# Patient Record
Sex: FEMALE | Race: WHITE | NOT HISPANIC OR LATINO | Employment: FULL TIME | URBAN - NONMETROPOLITAN AREA
[De-identification: names, ages, dates, MRNs, and addresses within clinical notes are randomized per-mention and may not be internally consistent; named-entity substitution may affect disease eponyms.]

---

## 2023-07-16 ENCOUNTER — OFFICE VISIT (OUTPATIENT)
Dept: URGENT CARE | Facility: CLINIC | Age: 53
End: 2023-07-16
Payer: COMMERCIAL

## 2023-07-16 VITALS
DIASTOLIC BLOOD PRESSURE: 78 MMHG | HEIGHT: 63 IN | BODY MASS INDEX: 33.31 KG/M2 | TEMPERATURE: 98.2 F | HEART RATE: 75 BPM | WEIGHT: 188 LBS | SYSTOLIC BLOOD PRESSURE: 132 MMHG | OXYGEN SATURATION: 98 % | RESPIRATION RATE: 16 BRPM

## 2023-07-16 DIAGNOSIS — N30.01 ACUTE CYSTITIS WITH HEMATURIA: Primary | ICD-10-CM

## 2023-07-16 LAB
SL AMB  POCT GLUCOSE, UA: NEGATIVE
SL AMB LEUKOCYTE ESTERASE,UA: ABNORMAL
SL AMB POCT BILIRUBIN,UA: NEGATIVE
SL AMB POCT BLOOD,UA: ABNORMAL
SL AMB POCT CLARITY,UA: ABNORMAL
SL AMB POCT COLOR,UA: YELLOW
SL AMB POCT KETONES,UA: NEGATIVE
SL AMB POCT NITRITE,UA: POSITIVE
SL AMB POCT PH,UA: 6
SL AMB POCT SPECIFIC GRAVITY,UA: 1.02
SL AMB POCT URINE PROTEIN: 2000
SL AMB POCT UROBILINOGEN: 0.2

## 2023-07-16 PROCEDURE — 81002 URINALYSIS NONAUTO W/O SCOPE: CPT

## 2023-07-16 PROCEDURE — 87077 CULTURE AEROBIC IDENTIFY: CPT

## 2023-07-16 PROCEDURE — 87086 URINE CULTURE/COLONY COUNT: CPT

## 2023-07-16 PROCEDURE — 87186 SC STD MICRODIL/AGAR DIL: CPT

## 2023-07-16 PROCEDURE — 99203 OFFICE O/P NEW LOW 30 MIN: CPT

## 2023-07-16 RX ORDER — NITROFURANTOIN 25; 75 MG/1; MG/1
100 CAPSULE ORAL 2 TIMES DAILY
Qty: 10 CAPSULE | Refills: 0 | Status: SHIPPED | OUTPATIENT
Start: 2023-07-16 | End: 2023-07-21

## 2023-07-16 RX ORDER — HYDROCHLOROTHIAZIDE 25 MG/1
25 TABLET ORAL DAILY
COMMUNITY
End: 2023-07-26 | Stop reason: ALTCHOICE

## 2023-07-16 RX ORDER — AMOXICILLIN 500 MG/1
500 CAPSULE ORAL
COMMUNITY
Start: 2023-06-12 | End: 2023-07-26 | Stop reason: ALTCHOICE

## 2023-07-16 RX ORDER — LISINOPRIL 30 MG/1
30 TABLET ORAL DAILY
COMMUNITY
Start: 2023-04-23

## 2023-07-16 RX ORDER — ATENOLOL 50 MG/1
75 TABLET ORAL DAILY
COMMUNITY
Start: 2023-05-17

## 2023-07-16 RX ORDER — CHLORTHALIDONE 25 MG/1
25 TABLET ORAL DAILY
COMMUNITY
Start: 2023-06-10

## 2023-07-16 NOTE — PATIENT INSTRUCTIONS
Take antibiotic as directed. Take entire course of antibiotics. Eat yogurt with live and active cultures and/or take a probiotic at least 3 hours before or after antibiotic dose. Monitor stool for diarrhea and/or blood. If this occurs, contact primary care doctor ASAP. Recommend drinking plenty of fluids including water and cranberry juice. Recommend avoiding caffeine or alcohol. Reviewed proper toileting hygiene, wipe front to back. Empty bladder frequently. Reviewed that antibiotic will make birth control less effective in preventing pregnancy. Continue using condoms, ideally always, but at least until she starts next pill pack. Avoid sexual intercourse until antibiotics are complete. If you develop any high fever, severe back pain, abdominal pain, nausea, vomiting or any concerning symptoms please return proceed ER.

## 2023-07-16 NOTE — PROGRESS NOTES
[facility]        NAME: Malika Santana is a 46 y.o. female  : 1970    MRN: [id number]  DATE: 2023  TIME: 12:24 PM    Assessment and Plan   Acute cystitis with hematuria [N30.01]  1. Acute cystitis with hematuria  POCT urine dip    nitrofurantoin (MACROBID) 100 mg capsule    Urine culture        POCT urine dip: Will send urine culture. Patient Instructions     Take antibiotic as directed. Take entire course of antibiotics. Eat yogurt with live and active cultures and/or take a probiotic at least 3 hours before or after antibiotic dose. Monitor stool for diarrhea and/or blood. If this occurs, contact primary care doctor ASAP. Recommend drinking plenty of fluids including water and cranberry juice. Recommend avoiding caffeine or alcohol. Reviewed proper toileting hygiene, wipe front to back. Empty bladder frequently. Reviewed that antibiotic will make birth control less effective in preventing pregnancy. Continue using condoms, ideally always, but at least until she starts next pill pack. Avoid sexual intercourse until antibiotics are complete. If you develop any high fever, severe back pain, abdominal pain, nausea, vomiting or any concerning symptoms please return proceed ER. Chief Complaint     Chief Complaint   Patient presents with   • Urinary Tract Infection     C/o burning, urinary frequency, cloudy urine, foul odor since x3 days ago. Denies fever, chills. Noticed hematuria yesterday and after wiping. Also c/o aching LBP right side. Completed ABX for sinusitis x7 days a few days ago. Has taken motrin for back pain with relief. Reports hx of microscopic hematuria followed with urology x7 years ago.           History of Present Illness       Urinary Tract Infection         Review of Systems   Review of Systems      Current Medications       Current Outpatient Medications:   •  atenolol (TENORMIN) 50 mg tablet, Take 75 mg by mouth daily, Disp: , Rfl:   • chlorthalidone 25 mg tablet, Take 25 mg by mouth daily, Disp: , Rfl:   •  lisinopril (ZESTRIL) 30 mg tablet, Take 30 mg by mouth daily For blood pressure, Disp: , Rfl:   •  nitrofurantoin (MACROBID) 100 mg capsule, Take 1 capsule (100 mg total) by mouth 2 (two) times a day for 5 days, Disp: 10 capsule, Rfl: 0  •  amoxicillin (AMOXIL) 500 mg capsule, 500 mg (Patient not taking: Reported on 2023), Disp: , Rfl:   •  hydrochlorothiazide (HYDRODIURIL) 25 mg tablet, Take 25 mg by mouth daily (Patient not taking: Reported on 2023), Disp: , Rfl:     Current Allergies     Allergies as of 2023 - Reviewed 2023   Allergen Reaction Noted   • Honey bee venom Swelling 2023   • Morphine Itching 2020            The following portions of the patient's history were reviewed and updated as appropriate: allergies, current medications, past family history, past medical history, past social history, past surgical history and problem list.     Past Medical History:   Diagnosis Date   • Hypertension    • Microscopic hematuria        Past Surgical History:   Procedure Laterality Date   •  SECTION     • CYST REMOVAL     • SINUS SURGERY Left        History reviewed. No pertinent family history. Medications have been verified.         Objective   /78   Pulse 75   Temp 98.2 °F (36.8 °C)   Resp 16   Ht 5' 3" (1.6 m)   Wt 85.3 kg (188 lb)   SpO2 98%   BMI 33.30 kg/m²        Physical Exam     Physical Exam history and problem list.     Past Medical History:   Diagnosis Date   • Hypertension    • Microscopic hematuria        Past Surgical History:   Procedure Laterality Date   •  SECTION     • CYST REMOVAL     • SINUS SURGERY Left        History reviewed. No pertinent family history. Medications have been verified. Objective   /78   Pulse 75   Temp 98.2 °F (36.8 °C)   Resp 16   Ht 5' 3" (1.6 m)   Wt 85.3 kg (188 lb)   SpO2 98%   BMI 33.30 kg/m²        Physical Exam     Physical Exam  Vitals and nursing note reviewed. Constitutional:       General: She is not in acute distress. Appearance: Normal appearance. She is not ill-appearing or diaphoretic. HENT:      Head: Normocephalic. Cardiovascular:      Rate and Rhythm: Regular rhythm. Heart sounds: Normal heart sounds. No murmur heard. Pulmonary:      Effort: No respiratory distress. Breath sounds: Normal breath sounds. No stridor. No wheezing, rhonchi or rales. Chest:      Chest wall: No tenderness. Abdominal:      General: Bowel sounds are normal.      Tenderness: There is no right CVA tenderness or left CVA tenderness. Skin:     General: Skin is warm. Neurological:      Mental Status: She is alert.

## 2023-07-18 LAB — BACTERIA UR CULT: ABNORMAL

## 2023-07-26 PROBLEM — R31.29 MICROSCOPIC HEMATURIA: Status: ACTIVE | Noted: 2018-03-29

## 2023-07-26 PROBLEM — N28.1 ACQUIRED CYSTIC KIDNEY DISEASE: Status: ACTIVE | Noted: 2018-04-04

## 2023-07-26 PROBLEM — G47.33 OBSTRUCTIVE SLEEP APNEA SYNDROME: Status: ACTIVE | Noted: 2019-10-03
